# Patient Record
Sex: MALE | Race: WHITE | NOT HISPANIC OR LATINO | URBAN - METROPOLITAN AREA
[De-identification: names, ages, dates, MRNs, and addresses within clinical notes are randomized per-mention and may not be internally consistent; named-entity substitution may affect disease eponyms.]

---

## 2023-02-02 ENCOUNTER — OFFICE VISIT (OUTPATIENT)
Dept: SLEEP MEDICINE | Facility: MEDICAL CENTER | Age: 78
End: 2023-02-02
Payer: MEDICARE

## 2023-02-02 VITALS
SYSTOLIC BLOOD PRESSURE: 162 MMHG | OXYGEN SATURATION: 91 % | BODY MASS INDEX: 27.89 KG/M2 | WEIGHT: 184 LBS | HEART RATE: 91 BPM | DIASTOLIC BLOOD PRESSURE: 102 MMHG | RESPIRATION RATE: 16 BRPM | HEIGHT: 68 IN

## 2023-02-02 DIAGNOSIS — Z85.89 HISTORY OF HEAD AND NECK CANCER: ICD-10-CM

## 2023-02-02 DIAGNOSIS — I10 HYPERTENSION, UNSPECIFIED TYPE: ICD-10-CM

## 2023-02-02 DIAGNOSIS — G47.30 SLEEP APNEA, UNSPECIFIED TYPE: ICD-10-CM

## 2023-02-02 DIAGNOSIS — R09.02 HYPOXEMIA: ICD-10-CM

## 2023-02-02 PROCEDURE — 99204 OFFICE O/P NEW MOD 45 MIN: CPT | Performed by: INTERNAL MEDICINE

## 2023-02-02 RX ORDER — AMLODIPINE BESYLATE 5 MG/1
5 TABLET ORAL DAILY
COMMUNITY
Start: 2023-01-17

## 2023-02-02 RX ORDER — SIMVASTATIN 40 MG
40 TABLET ORAL NIGHTLY
COMMUNITY

## 2023-02-02 RX ORDER — LABETALOL 100 MG/1
100 TABLET, FILM COATED ORAL 3 TIMES DAILY
COMMUNITY
Start: 2022-12-05

## 2023-02-02 ASSESSMENT — ENCOUNTER SYMPTOMS
DEPRESSION: 0
FOCAL WEAKNESS: 0
DIAPHORESIS: 0
SHORTNESS OF BREATH: 0
STRIDOR: 0
SORE THROAT: 0
BLURRED VISION: 0
PND: 0
SPUTUM PRODUCTION: 0
NAUSEA: 0
VOMITING: 0
DIZZINESS: 0
TREMORS: 0
HEADACHES: 0
PHOTOPHOBIA: 0
EYE REDNESS: 0
WEAKNESS: 0
CHILLS: 0
MYALGIAS: 0
DOUBLE VISION: 0
CLAUDICATION: 0
BACK PAIN: 0
ORTHOPNEA: 0
EYE DISCHARGE: 0
HEARTBURN: 0
NECK PAIN: 0
CONSTIPATION: 0
FEVER: 0
WHEEZING: 0
FALLS: 0
SPEECH CHANGE: 0
DIARRHEA: 0
HEMOPTYSIS: 0
WEIGHT LOSS: 0
COUGH: 0
EYE PAIN: 0
ABDOMINAL PAIN: 0
SINUS PAIN: 0
PALPITATIONS: 0

## 2023-02-02 NOTE — PROGRESS NOTES
Chief Complaint   Patient presents with    Establish Care     Referral Jai Montague MD DX COPD        HPI: This patient is a 77 y.o. male presenting for evaluation of hypoxemia noted during a recent hospital stay.  Patient was never a regular tobacco smoker smoking only socially in his 20s..  Past medical history significant for tongue cancer diagnosed in 2008 status post radiation therapy and hypertension.  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  No family history of atopic or autoimmune disease.  He was hospitalized in October at [facility] in [facility] following an adverse reaction to lisinopril at which point he was noted to have low peripheral oxygenation started on supplemental oxygen with activity.  Patient denies shortness of breath, chronic cough or wheezing.  Apparently had a CT chest at the time that he was told was clear.  He does not recall if he had an echocardiogram.  He lives at [facility] and walks on a regular basis without dyspnea on exertion.  Unfortunately I do not have any of the records at this time for review.  No pulmonary function testing.    History reviewed. No pertinent past medical history.    Social History     Socioeconomic History    Marital status:      Spouse name: Not on file    Number of children: Not on file    Years of education: Not on file    Highest education level: Not on file   Occupational History    Not on file   Tobacco Use    Smoking status: Never     Passive exposure: Never    Smokeless tobacco: Never   Vaping Use    Vaping Use: Never used   Substance and Sexual Activity    Alcohol use: Not on file    Drug use: Never    Sexual activity: Not on file   Other Topics Concern    Not on file   Social History Narrative    Not on file     Social Determinants of Health     Financial Resource Strain: Not on file   Food Insecurity: Not on file  "  Transportation Needs: Not on file   Physical Activity: Not on file   Stress: Not on file   Social Connections: Not on file   Intimate Partner Violence: Not on file   Housing Stability: Not on file       History reviewed. No pertinent family history.    Current Outpatient Medications on File Prior to Visit   Medication Sig Dispense Refill    amLODIPine (NORVASC) 5 MG Tab Take 5 mg by mouth every day.      labetalol (NORMODYNE) 100 MG Tab Take 100 mg by mouth 3 times a day.      simvastatin (ZOCOR) 40 MG Tab Take 40 mg by mouth every evening.       No current facility-administered medications on file prior to visit.       Allergies: Lisinopril    ROS:   Review of Systems   Constitutional:  Negative for chills, diaphoresis, fever, malaise/fatigue and weight loss.   HENT:  Negative for congestion, ear discharge, ear pain, hearing loss, nosebleeds, sinus pain, sore throat and tinnitus.    Eyes:  Negative for blurred vision, double vision, photophobia, pain, discharge and redness.   Respiratory:  Negative for cough, hemoptysis, sputum production, shortness of breath, wheezing and stridor.    Cardiovascular:  Negative for chest pain, palpitations, orthopnea, claudication, leg swelling and PND.   Gastrointestinal:  Negative for abdominal pain, constipation, diarrhea, heartburn, nausea and vomiting.   Genitourinary:  Negative for dysuria and urgency.   Musculoskeletal:  Negative for back pain, falls, joint pain, myalgias and neck pain.   Skin:  Negative for itching and rash.   Neurological:  Negative for dizziness, tremors, speech change, focal weakness, weakness and headaches.   Endo/Heme/Allergies:  Negative for environmental allergies.   Psychiatric/Behavioral:  Negative for depression.      BP (!) 162/102 (BP Location: Left arm, Patient Position: Sitting, BP Cuff Size: Adult)   Pulse 91   Resp 16   Ht 1.727 m (5' 8\")   Wt 83.5 kg (184 lb)   SpO2 91%     Physical Exam:  Physical Exam  Vitals reviewed. "   Constitutional:       General: He is not in acute distress.     Appearance: Normal appearance. He is normal weight.   HENT:      Head: Normocephalic and atraumatic.      Right Ear: External ear normal.      Left Ear: External ear normal.      Nose: Nose normal. No congestion.      Mouth/Throat:      Mouth: Mucous membranes are moist.      Pharynx: Oropharynx is clear. No oropharyngeal exudate.   Eyes:      General: No scleral icterus.     Extraocular Movements: Extraocular movements intact.      Conjunctiva/sclera: Conjunctivae normal.      Pupils: Pupils are equal, round, and reactive to light.   Cardiovascular:      Rate and Rhythm: Normal rate and regular rhythm.      Heart sounds: Normal heart sounds. No murmur heard.    No gallop.   Pulmonary:      Effort: Pulmonary effort is normal. No respiratory distress.      Breath sounds: Normal breath sounds. No wheezing or rales.   Abdominal:      General: There is no distension.      Palpations: Abdomen is soft.   Musculoskeletal:         General: Normal range of motion.      Cervical back: Normal range of motion and neck supple.      Right lower leg: No edema.      Left lower leg: No edema.   Skin:     General: Skin is warm and dry.      Findings: No rash.   Neurological:      Mental Status: He is alert and oriented to person, place, and time.      Cranial Nerves: No cranial nerve deficit.   Psychiatric:         Mood and Affect: Mood normal.         Behavior: Behavior normal.       PFTs as reviewed by me personally: None    Imaging as reviewed by me personally: None    Assessment:  1. Hypoxemia  PULMONARY FUNCTION TESTS -Test requested: Complete Pulmonary Function Test    Exercise Test for Bronchospasm / 6-Minute Walk    Polysomnography, 4 or More    EC-ECHOCARDIOGRAM COMPLETE W/O CONT    CANCELED: PULMONARY FUNCTION TESTS -Test requested: Complete Pulmonary Function Test    CANCELED: Exercise Test for Bronchospasm / 6-Minute Walk      2. History of head and neck  cancer  Polysomnography, 4 or More      3. Hypertension, unspecified type  Polysomnography, 4 or More    EC-ECHOCARDIOGRAM COMPLETE W/O CONT    Referral to Pulmonary and Sleep Medicine      4. Sleep apnea, unspecified type  Polysomnography, 4 or More    Referral to Pulmonary and Sleep Medicine          Plan:  Patient is not hypoxic in clinic today and given insignificant tobacco history a low suspicion for something such as COPD.  He does have occupational exposures which put him at risk for interstitial lung disease but exam is not suggestive of this.  He has uncontrolled hypertension and a history of radiation to his upper airway which suggest a possible diagnosis of untreated obstructive sleep apnea.  I recommended full pulmonary function testing, echocardiogram with bubble, 6-minute walk test and polysomnogram.  Patient has home oximeter to monitor SPO2 and will use oxygen for saturations less than 88%.  This does put patient at risk for abnormal upper airway which may predispose him to obstructive sleep apnea as per above.  Also potential radiation changes in the pulmonary parenchyma.  We will try and obtain any CT imaging obtained in [place] and complete work-up as per above.  Uncontrolled hypertension suggestive of possible sleep apnea.  See discussion and work-up as per above.  Presumed.  See discussion above.  Return in about 4 months (around 6/2/2023) for please coordinate PFT, 6 MWT and PSG same visit if possible for out of town pt.

## 2023-02-20 ENCOUNTER — TELEPHONE (OUTPATIENT)
Dept: HEALTH INFORMATION MANAGEMENT | Facility: OTHER | Age: 78
End: 2023-02-20
Payer: MEDICARE

## 2023-03-06 ENCOUNTER — TELEPHONE (OUTPATIENT)
Dept: SLEEP MEDICINE | Facility: MEDICAL CENTER | Age: 78
End: 2023-03-06
Payer: MEDICARE

## 2023-03-06 NOTE — TELEPHONE ENCOUNTER
Caller: - [facility]    Topic/issue: AM was supposed to be sending a referral over to  for an Echo for [facility].    Fax - [phone]    Callback Number: [phone]    Thank you,   Ondina TOLENTINO

## 2023-03-07 NOTE — TELEPHONE ENCOUNTER
The order for the Echo was sent to [facility]. It was also documented in the patients order referral for authorizations.     Order faxed once again. If an authorization is needed from insurance. Imaging authorizations will need to process and then forward everything to [facility]. Patient was informed of this during his visit and was provided a copy of the order.

## 2023-03-24 ENCOUNTER — SLEEP STUDY (OUTPATIENT)
Dept: SLEEP MEDICINE | Facility: MEDICAL CENTER | Age: 78
End: 2023-03-24
Attending: INTERNAL MEDICINE
Payer: MEDICARE

## 2023-03-24 DIAGNOSIS — Z85.89 HISTORY OF HEAD AND NECK CANCER: ICD-10-CM

## 2023-03-24 DIAGNOSIS — R09.02 HYPOXEMIA: ICD-10-CM

## 2023-03-24 DIAGNOSIS — G47.30 SLEEP APNEA, UNSPECIFIED TYPE: ICD-10-CM

## 2023-03-24 DIAGNOSIS — I10 HYPERTENSION, UNSPECIFIED TYPE: ICD-10-CM

## 2023-03-24 PROCEDURE — 95810 POLYSOM 6/> YRS 4/> PARAM: CPT | Performed by: INTERNAL MEDICINE

## 2023-03-25 NOTE — PROCEDURES
MONTAGE: Standard  STUDY TYPE: Diagnostic  RECORDING TECHNIQUE:   After the scalp was prepared, gold plated electrodes were applied to the scalp according to the International 10-20 System. EEG (electroencephalogram) was continuously monitored from the O1-M2, O2-M1, C3-M2, C4-M1, F3-M2, and F4-M1. EOGs (electrooculograms) were monitored by electrodes placed at the left and right outer canthi. Chin EMG (electromyogram) was monitored by electrodes placed on the mentalis and sub-mentalis muscles. Nasal and oral airflow were monitored using a triple port thermocouple as well as oronasal pressure transducer. Respiratory effort was measured by inductive plethysmography technology employing abdominal and thoracic belts. Blood oxygen saturation and pulse were monitored by pulse oximetry. Heart rhythm was monitored by surface electrocardiogram. Leg EMG was studied using surface electrodes placed on left and right anterior tibialis. A microphone was used to monitor tracheal sounds and snoring. Body position was monitored and documented by technician observation.   SCORING CRITERIA:   A modification of the AASM manual for scoring of sleep and associated events was used. Obstructive apneas were scored by cessation of airflow for at least 10 seconds with continuing respiratory effort. Central apneas were scored by cessation of airflow for at least 10 seconds with no respiratory effort. Hypopneas were scored by a 30% or more reduction in airflow for at least 10 seconds accompanied by arterial oxygen desaturation of 3% or an arousal. For CMS (Medicare) patients, per AASM rule 1B, hypopneas are scored by 30% with mild reduction in airflow for at least 10 seconds accompanied by arterial saturation decreased at 4%.  Study start time was 09:54:04 PM. Diagnostic recording time was 8h 16.5m with a total sleep time of 5h 47.5m resulting in a sleep efficiency of 69.99%%. Sleep latency from the start of the study was 29 minutes and the  latency from sleep to REM was 65 minutes. In total,28 arousals were scored for an arousal index of 4.8.  Respiratory:  There were a total of 5 apneas consisting of 5 obstructive apneas, 0 mixed apneas, and 0 central apneas. A total of 59 hypopneas were scored. The apnea index was 0.86 per hour and the hypopnea index was 10.19 per hour resulting in an overall AHI of 11.05. AHI during rem was 18.9 and AHI while supine was 0.00.  Oximetry:  There was a mean oxygen saturation of 82.0%. The minimum oxygen saturation in NREM was 72.0 % and in REM was 70.0. The patient spent 345.8 minutes of TST with SaO2 <88%.  Cardiac:  The highest heart rate seen while awake was 84 BPM while the highest heart rate during sleep was 77 BPM with an average sleeping heart rate of 64 BPM.  Limb Movements:  There were a total of 132 PLMs during sleep which resulted in a PLMS index of 22.8. Of these, 11 were associated with arousals which resulted in a PLMS arousal index of 1.9.    ASSESSMENT:    Mild sleep apnea hypopnea - AHI 11.1, mean event duration 25.2 seconds, longest event duration 36.6 seconds  Persistent nocturnal desaturation - francois saturation 70.0% - saturations less than or equal to 88% for 99.7% of the TST  Failure to meet the split-night protocol secondary to an insufficient number of qualifying respiratory events before 2 AM        RECOMMENDATION:    Given the likely need for supplemental bleed-in oxygen in addition to PAP, recommend a Pap titration.    Dr. Kumar Avery MD

## 2023-07-11 ENCOUNTER — NON-PROVIDER VISIT (OUTPATIENT)
Dept: SLEEP MEDICINE | Facility: MEDICAL CENTER | Age: 78
End: 2023-07-11
Attending: INTERNAL MEDICINE
Payer: MEDICARE

## 2023-07-11 VITALS — HEIGHT: 68 IN | WEIGHT: 181 LBS | BODY MASS INDEX: 27.43 KG/M2

## 2023-07-11 VITALS
WEIGHT: 180.7 LBS | SYSTOLIC BLOOD PRESSURE: 140 MMHG | BODY MASS INDEX: 27.38 KG/M2 | HEIGHT: 68 IN | DIASTOLIC BLOOD PRESSURE: 70 MMHG | OXYGEN SATURATION: 90 % | HEART RATE: 71 BPM

## 2023-07-11 DIAGNOSIS — R09.02 HYPOXEMIA: ICD-10-CM

## 2023-07-11 PROCEDURE — 94618 PULMONARY STRESS TESTING: CPT | Performed by: INTERNAL MEDICINE

## 2023-07-11 PROCEDURE — 99211 OFF/OP EST MAY X REQ PHY/QHP: CPT | Performed by: INTERNAL MEDICINE

## 2023-07-11 PROCEDURE — 94729 DIFFUSING CAPACITY: CPT | Mod: 26 | Performed by: INTERNAL MEDICINE

## 2023-07-11 PROCEDURE — 94618 PULMONARY STRESS TESTING: CPT | Mod: 26 | Performed by: INTERNAL MEDICINE

## 2023-07-11 PROCEDURE — 3078F DIAST BP <80 MM HG: CPT | Performed by: INTERNAL MEDICINE

## 2023-07-11 PROCEDURE — 3077F SYST BP >= 140 MM HG: CPT | Performed by: INTERNAL MEDICINE

## 2023-07-11 PROCEDURE — 94060 EVALUATION OF WHEEZING: CPT | Mod: 26 | Performed by: INTERNAL MEDICINE

## 2023-07-11 PROCEDURE — 99214 OFFICE O/P EST MOD 30 MIN: CPT | Mod: 25 | Performed by: INTERNAL MEDICINE

## 2023-07-11 PROCEDURE — 94726 PLETHYSMOGRAPHY LUNG VOLUMES: CPT | Performed by: INTERNAL MEDICINE

## 2023-07-11 PROCEDURE — 94726 PLETHYSMOGRAPHY LUNG VOLUMES: CPT | Mod: 26 | Performed by: INTERNAL MEDICINE

## 2023-07-11 PROCEDURE — 94060 EVALUATION OF WHEEZING: CPT | Performed by: INTERNAL MEDICINE

## 2023-07-11 PROCEDURE — 94729 DIFFUSING CAPACITY: CPT | Performed by: INTERNAL MEDICINE

## 2023-07-11 RX ORDER — GABAPENTIN 600 MG/1
600 TABLET ORAL 3 TIMES DAILY
COMMUNITY

## 2023-07-11 ASSESSMENT — ENCOUNTER SYMPTOMS
SINUS PAIN: 0
EYE REDNESS: 0
SORE THROAT: 0
FOCAL WEAKNESS: 0
PHOTOPHOBIA: 0
WEIGHT LOSS: 0
COUGH: 0
BLURRED VISION: 0
FEVER: 0
FALLS: 0
NECK PAIN: 0
NAUSEA: 0
DIZZINESS: 0
CLAUDICATION: 0
HEARTBURN: 0
VOMITING: 0
STRIDOR: 0
BACK PAIN: 0
PND: 0
HEMOPTYSIS: 0
TREMORS: 0
ABDOMINAL PAIN: 0
EYE PAIN: 0
DIAPHORESIS: 0
SPEECH CHANGE: 0
HEADACHES: 0
DIARRHEA: 0
CONSTIPATION: 0
MYALGIAS: 0
WEAKNESS: 0
WHEEZING: 0
DEPRESSION: 0
SPUTUM PRODUCTION: 0
CHILLS: 0
SHORTNESS OF BREATH: 0
PALPITATIONS: 0
DOUBLE VISION: 0
EYE DISCHARGE: 0
ORTHOPNEA: 0

## 2023-07-11 ASSESSMENT — PULMONARY FUNCTION TESTS
FVC_PERCENT_PREDICTED: 111
FEV1/FVC: 76
FEV1/FVC: 74
FEV1: 3.13
FEV1_PERCENT_PREDICTED: 112
FVC: 4.12
FEV1_PERCENT_CHANGE: 0
FEV1_PERCENT_PREDICTED: 108
FEV1/FVC_PREDICTED: 76
FEV1/FVC_PERCENT_LLN: 63
FEV1_LLN: 2.32
FEV1_PERCENT_CHANGE: 4
FVC_PREDICTED: 3.69
FEV1_PREDICTED: 2.77
FEV1/FVC_PERCENT_PREDICTED: 98
FVC_PERCENT_PREDICTED: 110
FEV1/FVC: 75.97
FEV1/FVC_PERCENT_PREDICTED: 101
FVC: 4.09
FVC_LLN: 3.08
FEV1/FVC_PERCENT_CHANGE: 3
FEV1/FVC_PERCENT_PREDICTED: 97
FEV1: 3.01
FEV1/FVC_PERCENT_PREDICTED: 75
FEV1/FVC_PERCENT_PREDICTED: 100
FEV1/FVC: 74

## 2023-07-11 ASSESSMENT — 6 MINUTE WALK TEST (6MWT)
HEART RATE AT 2 MIN: 80
COMMENTS: TEST ON ROOM AIR.
PERCEIVED FATIGUE AT 1 MIN: 0
PERCEIVED FATIGUE AT 5 MIN: 0
HEART RATE AT 1 MIN: 77
HEART RATE AT 2 MIN: 82
HEART RATE AT 3 MIN: 82
PERCEIVED FATIGUE AT 3 MIN: 0
HEART RATE AT 6 MIN: 85
BLOOD PRESSURE: LEFT ARM
AMBULATES WITH O2: WITHOUT O2
SAO2 AT 3 MIN: 94
PERCEIVED FATIGUE AT 2 MIN: 0
HEART RATE: 70
HEART RATE AT 5 MIN: 80
PERCEIVED FATIGUE AT 4 MIN: 0
PERCEIVED BREATHLESSNESS AT 2 MIN: 0
PERCEIVED FATIGUE AT 6 MIN: 0
PERCEIVED BREATHLESSNESS AT 2 MIN: 0
SAO2 AT 1 MIN: 93
PERCEIVED FATIGUE AT 1 MIN: 0
NUMBER OF RESTS: 0
PERCENT OF NORMAL WALKED: 79
PERCEIVED BREATHLESSNESS AT 6 MIN: 0
PERCEIVED FATIGUE AT 2 MIN: 0
PERCEIVED BREATHLESSNESS AT 1 MIN: 0
HEART RATE AT 4 MIN: 83
PERCEIVED BREATHLESSNESS AT 3 MIN: 0
SAO2 AT 5 MIN: 93
PERCEIVED BREATHLESSNESS AT 1 MIN: 0
SAO2 AT 4 MIN: 90
SAO2 AT 2 MIN: 91
SAO2 AT 1 MIN: 94
HEART RATE AT 1 MIN: 84
SAO2 AT 2 MIN: 92
SAO2 AT 6 MIN: 95
PERCEIVED BREATHLESSNESS AT 4 MIN: 0
TOTAL REST TIME: 0
SITTING BLOOD PRESSURE: 142/90
O2 SAT PERCENT ROOM AIR: 94
BLOOD PRESSURE AT 1 MIN: 142/90
PERCEIVED BREATHLESSNESS AT 5 MIN: 0

## 2023-07-11 NOTE — PROCEDURES
Technician: FILI Mcallister    Technician Comment:  Good patient effort & cooperation.  The results of this test meet the ATS/ERS standards for acceptability & reproducibility.  Test was performed on the [employer] Body Plethysmograph-Elite DX system.  Predicted values were GLI-2012 for spirometry, GLI-2017 for DLCO, ITS for Lung Volumes.  The DLCO was uncorrected for Hgb.  A bronchodilator of Albuterol HFA -2puffs via spacer administered.  DLCO performed during dilation period.    Interpretation:   Baseline spirometry shows normal airflows.  No significant bronchodilator response.  Lung volumes are within normal limits.  Diffusion capacity is within normal limits.  Normal pulmonary function testing with normal flow volume loop.

## 2023-07-11 NOTE — PROGRESS NOTES
"Chief Complaint   Patient presents with    Follow-Up       LAST SEEN 2/2/23, PFT 7/11/23, 6 MWT 7/11/23         HPI: This patient is a 78 y.o. male whom is followed in our clinic for hypoxemia last seen by me on 2/2/23.  Patient was never a regular tobacco smoker smoking only socially in his 20s..  Past medical history significant for tongue cancer diagnosed in 2008 status post radiation therapy and hypertension.  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  No family history of atopic or autoimmune disease.  He was hospitalized in October at [facility] in [facility] [address] following an adverse reaction to lisinopril at which point he was noted to have low peripheral oxygenation started on supplemental oxygen with activity. CT report at the time showed \"emphysema\" and \"RLL scar.\"  He lives at [facility] and walks on a regular basis without dyspnea on exertion. No cough or wheezing. No CP. PFT today shows normal air flows, normal lung volumes and normal DLCO. No desaturation on ambulatory oximetry. TTE was ordered but not obtained. He did have sleep study which showed only mildly elevated AHI of 11 but significant desaturation with mean SpO2 of 82% and 345 min <88 %. Pt has not been using O2 at night but sleeps with HOB elevated. No sxs of excessive daytime sleepines, no morning HA, no snoring.     History reviewed. No pertinent past medical history.    Social History     Socioeconomic History    Marital status:      Spouse name: Not on file    Number of children: Not on file    Years of education: Not on file    Highest education level: Not on file   Occupational History    Not on file   Tobacco Use    Smoking status: Never     Passive exposure: Never    Smokeless tobacco: Never   Vaping Use    Vaping Use: Never used   Substance and Sexual Activity    Alcohol use: Not on file    Drug use: Never    " Sexual activity: Not on file   Other Topics Concern    Not on file   Social History Narrative    Not on file     Social Determinants of Health     Financial Resource Strain: Not on file   Food Insecurity: Not on file   Transportation Needs: Not on file   Physical Activity: Not on file   Stress: Not on file   Social Connections: Not on file   Intimate Partner Violence: Not on file   Housing Stability: Not on file       History reviewed. No pertinent family history.    Current Outpatient Medications on File Prior to Visit   Medication Sig Dispense Refill    gabapentin (NEURONTIN) 600 MG tablet Take 600 mg by mouth 3 times a day.      amLODIPine (NORVASC) 5 MG Tab Take 5 mg by mouth every day.      labetalol (NORMODYNE) 100 MG Tab Take 100 mg by mouth 3 times a day.      simvastatin (ZOCOR) 40 MG Tab Take 40 mg by mouth every evening.       No current facility-administered medications on file prior to visit.       Lisinopril      ROS:   Review of Systems   Constitutional:  Negative for chills, diaphoresis, fever, malaise/fatigue and weight loss.   HENT:  Negative for congestion, ear discharge, ear pain, hearing loss, nosebleeds, sinus pain, sore throat and tinnitus.    Eyes:  Negative for blurred vision, double vision, photophobia, pain, discharge and redness.   Respiratory:  Negative for cough, hemoptysis, sputum production, shortness of breath, wheezing and stridor.    Cardiovascular:  Negative for chest pain, palpitations, orthopnea, claudication, leg swelling and PND.   Gastrointestinal:  Negative for abdominal pain, constipation, diarrhea, heartburn, nausea and vomiting.   Genitourinary:  Negative for dysuria and urgency.   Musculoskeletal:  Negative for back pain, falls, joint pain, myalgias and neck pain.   Skin:  Negative for itching and rash.   Neurological:  Negative for dizziness, tremors, speech change, focal weakness, weakness and headaches.   Endo/Heme/Allergies:  Negative for environmental allergies.  "  Psychiatric/Behavioral:  Negative for depression.        BP (!) 140/70 (BP Location: Right arm, Patient Position: Sitting, BP Cuff Size: Adult)   Pulse 71   Ht 1.727 m (5' 8\")   Wt 82 kg (180 lb 11.2 oz)   SpO2 90%   Physical Exam  Vitals reviewed.   Constitutional:       General: He is not in acute distress.     Appearance: Normal appearance. He is normal weight.   HENT:      Head: Normocephalic and atraumatic.      Right Ear: External ear normal.      Left Ear: External ear normal.      Nose: Nose normal. No congestion.      Mouth/Throat:      Mouth: Mucous membranes are moist.      Pharynx: Oropharynx is clear. No oropharyngeal exudate.   Eyes:      General: No scleral icterus.     Extraocular Movements: Extraocular movements intact.      Conjunctiva/sclera: Conjunctivae normal.      Pupils: Pupils are equal, round, and reactive to light.   Neck:      Comments: Radiation changes of the neck  Cardiovascular:      Rate and Rhythm: Normal rate and regular rhythm.      Heart sounds: Normal heart sounds. No murmur heard.     No gallop.   Pulmonary:      Effort: Pulmonary effort is normal. No respiratory distress.      Breath sounds: Normal breath sounds. No wheezing or rales.   Abdominal:      General: There is no distension.      Palpations: Abdomen is soft.   Musculoskeletal:         General: Normal range of motion.      Cervical back: Normal range of motion.      Right lower leg: No edema.      Left lower leg: No edema.   Skin:     General: Skin is warm and dry.      Findings: No rash.   Neurological:      Mental Status: He is alert and oriented to person, place, and time.      Cranial Nerves: No cranial nerve deficit.   Psychiatric:         Mood and Affect: Mood normal.         Behavior: Behavior normal.         PFTs as reviewed by me personally: as per hPI    Imaging as reviewed by me personally:  as per hPI    Assessment:  1. Hypoxemia  Overnight Oximetry          Plan:  This was incidental finding and " based on our w/u to date, only at night w/o significant sleep apnea. Possibly anatomical changes of his airway with sleep 2/2 hx of XRT. We discussed repeat OPO on RA with HOB elevated. If SpO2 is still low, I will recommend continued O2 use at night, if not, we can d/c O2.   Return in about 6 months (around 1/11/2024) for nocturnal hypoxemia .

## 2023-07-11 NOTE — PROCEDURES
Test on Room Air.    Interpretation;  No significant desaturation with ambulation.  Distance walked is 1200 feet or 79% predicted.

## 2023-08-28 ENCOUNTER — TELEPHONE (OUTPATIENT)
Dept: SLEEP MEDICINE | Facility: MEDICAL CENTER | Age: 78
End: 2023-08-28
Payer: MEDICARE

## 2023-08-28 NOTE — TELEPHONE ENCOUNTER
Emilia Guzman M.D.  Eliud Luis, Med Ass't  Would you let the patient know, there does not look like any sleep apnea but he does still need oxygen at night. We discussed this during his visit.

## 2023-11-10 ENCOUNTER — PATIENT MESSAGE (OUTPATIENT)
Dept: SLEEP MEDICINE | Facility: MEDICAL CENTER | Age: 78
End: 2023-11-10
Payer: MEDICARE